# Patient Record
Sex: MALE | HISPANIC OR LATINO | RURAL
[De-identification: names, ages, dates, MRNs, and addresses within clinical notes are randomized per-mention and may not be internally consistent; named-entity substitution may affect disease eponyms.]

---

## 2018-12-10 ENCOUNTER — APPOINTMENT (RX ONLY)
Dept: RURAL CLINIC 4 | Facility: CLINIC | Age: 33
Setting detail: DERMATOLOGY
End: 2018-12-10

## 2018-12-10 DIAGNOSIS — L91.8 OTHER HYPERTROPHIC DISORDERS OF THE SKIN: ICD-10-CM

## 2018-12-10 DIAGNOSIS — L71.8 OTHER ROSACEA: ICD-10-CM

## 2018-12-10 PROBLEM — L85.3 XEROSIS CUTIS: Status: ACTIVE | Noted: 2018-12-10

## 2018-12-10 PROCEDURE — 99202 OFFICE O/P NEW SF 15 MIN: CPT

## 2018-12-10 PROCEDURE — ? PRESCRIPTION

## 2018-12-10 PROCEDURE — ? TREATMENT REGIMEN

## 2018-12-10 PROCEDURE — ? COUNSELING

## 2018-12-10 RX ORDER — METRONIDAZOLE 7.5 MG/G
GEL TOPICAL
Qty: 1 | Refills: 2 | Status: ERX | COMMUNITY
Start: 2018-12-10

## 2018-12-10 RX ADMIN — METRONIDAZOLE: 7.5 GEL TOPICAL at 20:47

## 2018-12-10 ASSESSMENT — LOCATION ZONE DERM
LOCATION ZONE: FACE
LOCATION ZONE: NECK

## 2018-12-10 ASSESSMENT — LOCATION SIMPLE DESCRIPTION DERM
LOCATION SIMPLE: LEFT CHEEK
LOCATION SIMPLE: LEFT ANTERIOR NECK
LOCATION SIMPLE: RIGHT CHEEK

## 2018-12-10 ASSESSMENT — LOCATION DETAILED DESCRIPTION DERM
LOCATION DETAILED: LEFT CLAVICULAR NECK
LOCATION DETAILED: RIGHT CENTRAL MALAR CHEEK
LOCATION DETAILED: LEFT CENTRAL MALAR CHEEK

## 2018-12-10 NOTE — HPI: RASH (ROSACEA)
How Severe Is Your Rosacea?: moderate
Is This A New Presentation, Or A Follow-Up?: Rash
Additional History: He has noticed the rash moving up under his eyes and forehead.  He denies itching or burning at this time.  He has not been using anything but cetaphil lotion.

## 2019-01-21 ENCOUNTER — APPOINTMENT (RX ONLY)
Dept: RURAL CLINIC 4 | Facility: CLINIC | Age: 34
Setting detail: DERMATOLOGY
End: 2019-01-21

## 2019-01-21 DIAGNOSIS — L71.8 OTHER ROSACEA: ICD-10-CM | Status: IMPROVED

## 2019-01-21 PROBLEM — L29.8 OTHER PRURITUS: Status: ACTIVE | Noted: 2019-01-21

## 2019-01-21 PROCEDURE — 99212 OFFICE O/P EST SF 10 MIN: CPT

## 2019-01-21 PROCEDURE — ? TREATMENT REGIMEN

## 2019-01-21 ASSESSMENT — LOCATION ZONE DERM: LOCATION ZONE: FACE

## 2019-01-21 ASSESSMENT — LOCATION DETAILED DESCRIPTION DERM
LOCATION DETAILED: LEFT MEDIAL MALAR CHEEK
LOCATION DETAILED: RIGHT MEDIAL MALAR CHEEK

## 2019-01-21 ASSESSMENT — LOCATION SIMPLE DESCRIPTION DERM
LOCATION SIMPLE: LEFT CHEEK
LOCATION SIMPLE: RIGHT CHEEK

## 2019-01-21 ASSESSMENT — SEVERITY ASSESSMENT OVERALL AMONG ALL PATIENTS
IN YOUR EXPERIENCE, AMONG ALL PATIENTS YOU HAVE SEEN WITH THIS CONDITION, HOW SEVERE IS THIS PATIENT'S CONDITION?: MINIMAL

## 2019-01-21 NOTE — PROCEDURE: TREATMENT REGIMEN
Plan: Follow up PRN for further evaluation if breakouts worsen.  Will consider oral medication at that time.
Continue Regimen: Metronidazole gel 0.75% 1-2 times daily.  He will try to be more consistent with twice daily if he notices continued breakouts.
Detail Level: Simple

## 2020-04-14 RX ORDER — METRONIDAZOLE 7.5 MG/G
GEL TOPICAL
Qty: 1 | Refills: 2 | Status: ERX

## 2021-03-11 RX ORDER — METRONIDAZOLE 7.5 MG/G
GEL TOPICAL
Qty: 1 | Refills: 2 | Status: ERX

## 2023-11-14 ENCOUNTER — APPOINTMENT (RX ONLY)
Dept: URBAN - NONMETROPOLITAN AREA CLINIC 11 | Facility: CLINIC | Age: 38
Setting detail: DERMATOLOGY
End: 2023-11-14

## 2023-11-14 DIAGNOSIS — L71.8 OTHER ROSACEA: ICD-10-CM | Status: INADEQUATELY CONTROLLED

## 2023-11-14 PROCEDURE — ? COUNSELING

## 2023-11-14 PROCEDURE — 99204 OFFICE O/P NEW MOD 45 MIN: CPT

## 2023-11-14 PROCEDURE — ? TREATMENT REGIMEN

## 2023-11-14 PROCEDURE — ? PRESCRIPTION

## 2023-11-14 RX ORDER — IVERMECTIN/METRONIDAZOLE/NIACI 1 %-1 %-4%
GEL (GRAM) TOPICAL
Qty: 30 | Refills: 5 | Status: ERX | COMMUNITY
Start: 2023-11-14

## 2023-11-14 RX ORDER — DOXYCYCLINE HYCLATE 100 MG/1
CAPSULE, GELATIN COATED ORAL
Qty: 60 | Refills: 1 | Status: ERX | COMMUNITY
Start: 2023-11-14

## 2023-11-14 RX ADMIN — Medication: at 00:00

## 2023-11-14 RX ADMIN — DOXYCYCLINE HYCLATE: 100 CAPSULE, GELATIN COATED ORAL at 00:00

## 2023-11-14 ASSESSMENT — LOCATION DETAILED DESCRIPTION DERM
LOCATION DETAILED: LEFT CENTRAL MALAR CHEEK
LOCATION DETAILED: RIGHT CENTRAL MALAR CHEEK

## 2023-11-14 ASSESSMENT — LOCATION ZONE DERM: LOCATION ZONE: FACE

## 2023-11-14 ASSESSMENT — LOCATION SIMPLE DESCRIPTION DERM
LOCATION SIMPLE: RIGHT CHEEK
LOCATION SIMPLE: LEFT CHEEK

## 2023-11-14 NOTE — PROCEDURE: TREATMENT REGIMEN
Plan: Discussed etiology, pathogenesis, and triggers for rosacea. Recommend the following topical regimen:\\n\\n- Start topical ivermectin 1% / metronidazole 1% / niacinamide 4% / potassium azeloyl digylcinate 30%, 5% gel once daily (RX sent to [facility])\\n- Start Doxycycline 100mg BID x4 weeks. Counseled on SE.\\n- Counseled on importance of sun protection: Start gentle fragrance-free moisturizer with sunscreen (SPF30+) in AM\\n- Counseled on avoidance of triggers including sun exposure, spicy foods, alcohol, heat, fragrance-containing products, cosmetics\\n- Recommend gentle skin care: Switch to fragrance free skincare products such as Vanicream, Cetaphil, or Cera Ve. Recommend gentle daily facial cleanser and moisturizer twice daily in morning and evening. Minimize cosmetics and make-up as able.\\n- Follow-up in 3 months to assess response to above regimen
Detail Level: Zone